# Patient Record
Sex: FEMALE | Race: AMERICAN INDIAN OR ALASKA NATIVE
[De-identification: names, ages, dates, MRNs, and addresses within clinical notes are randomized per-mention and may not be internally consistent; named-entity substitution may affect disease eponyms.]

---

## 2017-11-17 ENCOUNTER — HOSPITAL ENCOUNTER (OUTPATIENT)
Dept: HOSPITAL 5 - SPVWC | Age: 47
Discharge: HOME | End: 2017-11-17
Attending: INTERNAL MEDICINE
Payer: COMMERCIAL

## 2017-11-17 DIAGNOSIS — Z12.31: Primary | ICD-10-CM

## 2017-11-17 PROCEDURE — 77067 SCR MAMMO BI INCL CAD: CPT

## 2017-11-17 PROCEDURE — G0202 SCR MAMMO BI INCL CAD: HCPCS

## 2017-11-17 NOTE — MAMMOGRAPHY REPORT
BILATERAL DIGITAL SCREENING MAMMOGRAM with CAD: 11/17/17 08:09:00



CLINICAL: Routine screening.



COMPARISON:11/16/16 and 08/26/15



FINDINGS: The breasts are almost entirely fatty. No mass, architectural 

distortion or suspicious calcifications.



IMPRESSION: No mammographic evidence of malignancy.



BI-RADS CATEGORY:  2 -- Benign



RECOMMENDATION: Routine mammographic screening in one year.





COMMENT:

Patient follow-up letters are generated by our [employer] application.

## 2019-12-17 ENCOUNTER — HOSPITAL ENCOUNTER (OUTPATIENT)
Dept: HOSPITAL 5 - SPVWC | Age: 49
Discharge: HOME | End: 2019-12-17
Attending: SURGERY
Payer: COMMERCIAL

## 2019-12-17 DIAGNOSIS — Z12.31: Primary | ICD-10-CM

## 2019-12-17 PROCEDURE — 77067 SCR MAMMO BI INCL CAD: CPT

## 2019-12-17 NOTE — MAMMOGRAPHY REPORT
DIGITAL SCREENING MAMMOGRAM WITH CAD, 12/17/2019



INDICATION: Routine screening mammography. 



TECHNIQUE:  Digital bilateral  2D mammography was obtained in the craniocaudal and mediolateral obliq
ue projections. This examination was interpreted with the benefit of Computer-Aided Detection analysi
s.



COMPARISON: 11/28/2018



FINDINGS: 



Breast Density: There are scattered areas of fibroglandular density.



There is no evidence of dominant mass, suspicious calcifications or architectural distortion in eithe
r breast.



IMPRESSION: No mammographic evidence of malignancy.





Follow up recommendation: Routine yearly



BI-RADS Category 1:  Negative.



A "normal" or negative report should not discourage follow up or biopsy of a clinically significant f
inding.



A written summary of these findings will be mailed to the patient. The patient will be entered into a
 mammography reporting system which will generate a reminder letter for the patient's next appointmen
t at the appropriate interval.



The [employer] recommends yearly mammograms starting at age 40 and continuing as l
shannan as a woman is in good health.  Breast MRI is recommended for women with an approximate 20-25% or 
greater lifetime risk of breast cancer, including women with a strong family history of breast or ova
ingris cancer or who have been treated for Hodgkin's disease.



Signer Name: Jack Olivier MD 

Signed: 12/17/2019 10:49 AM

 Workstation Name: [id number]

## 2020-12-18 ENCOUNTER — HOSPITAL ENCOUNTER (OUTPATIENT)
Dept: HOSPITAL 5 - SPVWC | Age: 50
Discharge: HOME | End: 2020-12-18
Attending: INTERNAL MEDICINE
Payer: COMMERCIAL

## 2020-12-18 DIAGNOSIS — Z12.31: Primary | ICD-10-CM

## 2020-12-18 PROCEDURE — 77067 SCR MAMMO BI INCL CAD: CPT

## 2020-12-18 NOTE — MAMMOGRAPHY REPORT
DIGITAL SCREENING MAMMOGRAM WITH CAD, 12/18/2020



INDICATION: Routine screening mammography. 



TECHNIQUE:  Digital bilateral  2D mammography was obtained in the craniocaudal and mediolateral obliq
ue projections. This examination was interpreted with the benefit of Computer-Aided Detection analysi
s.



COMPARISON: 12/17/2019.



FINDINGS: 



Breast Density: The breasts are almost entirely fatty.



There is no evidence of dominant mass, suspicious calcifications or architectural distortion in eithe
r breast.



IMPRESSION:



Follow up recommendation: Routine yearly



BI-RADS Category 1:  Negative.



A "normal" or negative report should not discourage follow up or biopsy of a clinically significant f
inding.



A written summary of these findings will be mailed to the patient. The patient will be entered into a
 mammography reporting system which will generate a reminder letter for the patient's next appointmen
t at the appropriate interval.



The [employer] recommends yearly mammograms starting at age 40 and continuing as l
shannan as a woman is in good health.  Breast MRI is recommended for women with an approximate 20-25% or 
greater lifetime risk of breast cancer, including women with a strong family history of breast or ova
ingris cancer or who have been treated for Hodgkin's disease.



Signer Name: Nicholas Juarez MD 

Signed: 12/18/2020 11:21 AM

Workstation Name: [id number]

## 2021-11-15 ENCOUNTER — HOSPITAL ENCOUNTER (EMERGENCY)
Dept: HOSPITAL 5 - ED | Age: 51
Discharge: HOME | End: 2021-11-15
Payer: COMMERCIAL

## 2021-11-15 VITALS — DIASTOLIC BLOOD PRESSURE: 91 MMHG | SYSTOLIC BLOOD PRESSURE: 185 MMHG

## 2021-11-15 DIAGNOSIS — I10: Primary | ICD-10-CM

## 2021-11-15 DIAGNOSIS — Z88.8: ICD-10-CM

## 2021-11-15 DIAGNOSIS — Z79.899: ICD-10-CM

## 2021-11-15 DIAGNOSIS — Z88.2: ICD-10-CM

## 2021-11-15 PROCEDURE — 99282 EMERGENCY DEPT VISIT SF MDM: CPT

## 2021-11-15 NOTE — EMERGENCY DEPARTMENT REPORT
ED General Adult HPI





- General


Chief complaint: High BP


Stated complaint: Elevated Blood Pressure


Source: patient


Mode of arrival: Ambulatory


Limitations: No Limitations





- History of Present Illness


Initial comments: 





Patient is a 51-year-old -American female with a history of hypertension 

who presents to the ED with significantly elevated blood pressure for the last 2

weeks after her medications were changed.  Patient states that she had 

previously been taking carvedilol 12.5 mg twice a day but she asked her primary 

care physician to change to medication that she can take once a day.  Patient 

states that she was started on losartan 40 mg daily but this has not controlled 

her blood pressure.  Patient states that previously her blood pressure was well 

controlled on carvedilol 12.5 mg twice a day.  Patient denies dizziness, 

syncope, chest pain, shortness of breath, generalized weakness, headache, nausea

and vomiting, diaphoresis, back pain, abdominal pain, palpitations, fever and 

chills or change in vision.


MD Complaint: Elevated blood pressure


-: Sudden, week(s) (2)


Location: head, chest


Radiation: non-radiation


Severity scale (0 -10): 0


Consistency: constant


Improves with: none


Worsens with: none


Associated Symptoms: denies other symptoms.  denies: confusion, chest pain, 

cough, diaphoresis, fever/chills, headaches, loss of appetite, malaise, 

nausea/vomiting, rash, seizure, shortness of breath, syncope, weakness, other


Treatments Prior to Arrival: none





- Related Data


                                  Previous Rx's











 Medication  Instructions  Recorded  Last Taken  Type


 


carvediloL [Coreg] 12.5 mg PO Q12H #60 tablet 11/15/21 Unknown Rx











                                    Allergies











Allergy/AdvReac Type Severity Reaction Status Date / Time


 


bupropion HCl Allergy  Hives Unverified 11/15/21 18:18





[From Wellbutrin]     


 


Sulfa (Sulfonamide Allergy  Swelling Unverified 11/15/21 18:18





Antibiotics)     














ED Review of Systems


ROS: 


Stated complaint: Elevated Blood Pressure


Other details as noted in HPI





Constitutional: other (Elevated blood pressure).  denies: chills, fever


Eyes: denies: eye pain, eye discharge, vision change


ENT: denies: ear pain, throat pain


Respiratory: denies: cough, shortness of breath, wheezing


Cardiovascular: denies: chest pain, palpitations


Endocrine: no symptoms reported


Gastrointestinal: denies: abdominal pain, nausea, diarrhea


Genitourinary: denies: urgency, dysuria, discharge


Musculoskeletal: denies: back pain, joint swelling, arthralgia


Skin: denies: rash, lesions


Neurological: denies: headache, weakness, paresthesias


Psychiatric: denies: anxiety, depression


Hematological/Lymphatic: denies: easy bleeding, easy bruising





ED Past Medical Hx





- Past Medical History


Hx Hypertension: Yes





- Medications


Home Medications: 


                                Home Medications











 Medication  Instructions  Recorded  Confirmed  Last Taken  Type


 


carvediloL [Coreg] 12.5 mg PO Q12H #60 tablet 11/15/21  Unknown Rx














ED Physical Exam





- General


Limitations: No Limitations


General appearance: alert, in no apparent distress





- Head


Head exam: Present: atraumatic, normocephalic, normal inspection





- Eye


Eye exam: Present: normal appearance, PERRL, EOMI


Pupils: Present: normal accommodation





- ENT


ENT exam: Present: normal exam, normal orophraynx, mucous membranes moist, TM's 

normal bilaterally, normal external ear exam





- Neck


Neck exam: Present: normal inspection, full ROM





- Respiratory


Respiratory exam: Present: normal lung sounds bilaterally.  Absent: respiratory 

distress, wheezes, rales, rhonchi, chest wall tenderness, accessory muscle use, 

decreased breath sounds





- Cardiovascular


Cardiovascular Exam: Present: regular rate, normal rhythm, normal heart sounds. 

 Absent: systolic murmur, diastolic murmur, rubs, gallop





- GI/Abdominal


GI/Abdominal exam: Present: soft, normal bowel sounds.  Absent: tenderness, 

guarding, rebound, hyperactive bowel sounds, hypoactive bowel sounds, 

organomegaly, mass





- Extremities Exam


Extremities exam: Present: normal inspection, full ROM, normal capillary refill





- Back Exam


Back exam: Present: normal inspection, full ROM.  Absent: tenderness, CVA 

tenderness (R), CVA tenderness (L), muscle spasm, vertebral tenderness





- Neurological Exam


Neurological exam: Present: alert, oriented X3, CN II-XII intact, normal gait, 

reflexes normal





- Psychiatric


Psychiatric exam: Present: normal affect, normal mood





- Skin


Skin exam: Present: warm, dry, intact, normal color.  Absent: rash





ED Course





                                   Vital Signs











  11/15/21 11/15/21





  18:14 22:29


 


Temperature 98.5 F 98.9 F


 


Pulse Rate 91 H 80


 


Respiratory 18 17





Rate  


 


Blood Pressure 220/114 167/79





[Right]  


 


O2 Sat by Pulse 98 100





Oximetry  














ED Medical Decision Making





- Medical Decision Making





This is a 51-year-old -American female with a history of hypertension who

 presents to the ED with significantly elevated blood pressure for the last 2 

weeks after her medications were changed.  Patient states that she had previousl

y been taking carvedilol 12.5 mg twice a day but she asked her primary care 

physician to change to medication that she can take once a day.  Patient states 

that she was started on losartan 40 mg daily but this has not controlled her 

blood pressure.  Patient states that previously her blood pressure was well 

controlled on carvedilol 12.5 mg twice a day.  In the ED, patient is alert and 

oriented x3 and is not in any distress.  Patient's blood pressure is 

significantly elevated in triage, 210/114.  Patient however has no other 

symptoms other than poorly controlled hypertension.  Patient was treated in the 

ED for uncontrolled hypertension with hydralazine 50 mg p.o. x1.  Patient was 

observed in the ED for over 1 hour and when the blood pressure was rechecked it 

significantly improved.  Patient was still symptomatic in the ED.  Patient will 

discharge home on original blood pressure medication that she previously took, 

carvedilol 12.5 mg twice a day and advised to follow-up with her primary care 

physician in 3 to 5 days for reevaluation.  Patient is advised return to the ED 

immediately if symptoms get worse.





- Differential Diagnosis


Uncontrolled hypertension; anxiety;


Critical care attestation.: 


If time is entered above; I have spent that time in minutes in the direct care 

of this critically ill patient, excluding procedure time.








ED Disposition


Clinical Impression: 


 Uncontrolled stage 2 hypertension





Disposition: 01 HOME / SELF CARE / HOMELESS


Is pt being admited?: No


Does the pt Need Aspirin: No


Condition: Stable


Instructions:  Hypertension (ED), Hypertension, Adult, Easy-to-Read


Additional Instructions: 


Take medication as advised, drink plenty of fluids and follow-up with your 

primary care physician in 3 to 5 days for reevaluation.  Return to the ED 

immediately if symptoms get worse.


Prescriptions: 


carvediloL [Coreg] 12.5 mg PO Q12H #60 tablet


Referrals: 


[facility] [Provider Group] - 3-5 Days


Time of Disposition: 23:29


Print Language: ENGLISH

## 2021-12-23 ENCOUNTER — HOSPITAL ENCOUNTER (OUTPATIENT)
Dept: HOSPITAL 5 - SPVWC | Age: 51
Discharge: HOME | End: 2021-12-23
Attending: INTERNAL MEDICINE
Payer: COMMERCIAL

## 2021-12-23 DIAGNOSIS — Z12.31: Primary | ICD-10-CM

## 2021-12-23 PROCEDURE — 77067 SCR MAMMO BI INCL CAD: CPT

## 2021-12-23 NOTE — MAMMOGRAPHY REPORT
DIGITAL SCREENING MAMMOGRAM WITH CAD, 12/23/2021



CLINICAL INFORMATION / INDICATION: Routine screening mammography. SCREENING MAMMOGRAM



TECHNIQUE:  Digital bilateral 2D mammography was obtained in the craniocaudal and mediolateral obliqu
e projections. This examination was interpreted with the benefit of Computer-Aided Detection analysis
.



COMPARISON: 8/1/2013 through 12/18/2020.



FINDINGS: 



Breast Density: The breasts are almost entirely fatty.



No dominant mass, suspicious calcifications, or architectural distortion in either breast. 





 

IMPRESSION: No mammographic evidence of malignancy.



Follow up recommendation: Routine yearly



BI-RADS Category 1:  NEGATIVE





-------------------------------------------------------------------------------------------

A "normal" or negative report should not discourage follow up or biopsy of a clinically significant f
inding.



A written summary of these findings will be mailed to the patient. The patient will be entered into a
 mammography reporting system which will generate a reminder letter for the patient's next appointmen
t at the appropriate interval.



The [employer] recommends yearly mammograms starting at age 40 and continuing as l
shannan as a woman is in good health.  Breast MRI is recommended for women with an approximate 20-25% or 
greater lifetime risk of breast cancer, including women with a strong family history of breast or ova
ingris cancer or who have been treated for Hodgkin's disease.



Signer Name: Emil Goodrich MD 

Signed: 12/23/2021 3:59 PM

Workstation Name: [id number]